# Patient Record
Sex: FEMALE | Race: WHITE | NOT HISPANIC OR LATINO | Employment: FULL TIME | URBAN - METROPOLITAN AREA
[De-identification: names, ages, dates, MRNs, and addresses within clinical notes are randomized per-mention and may not be internally consistent; named-entity substitution may affect disease eponyms.]

---

## 2021-06-30 ENCOUNTER — TRANSFERRED RECORDS (OUTPATIENT)
Dept: HEALTH INFORMATION MANAGEMENT | Facility: CLINIC | Age: 61
End: 2021-06-30

## 2021-07-09 ENCOUNTER — TRANSFERRED RECORDS (OUTPATIENT)
Dept: HEALTH INFORMATION MANAGEMENT | Facility: CLINIC | Age: 61
End: 2021-07-09

## 2021-07-27 ENCOUNTER — TRANSFERRED RECORDS (OUTPATIENT)
Dept: HEALTH INFORMATION MANAGEMENT | Facility: CLINIC | Age: 61
End: 2021-07-27

## 2021-09-03 ENCOUNTER — TRANSFERRED RECORDS (OUTPATIENT)
Dept: HEALTH INFORMATION MANAGEMENT | Facility: CLINIC | Age: 61
End: 2021-09-03

## 2021-09-15 ENCOUNTER — TRANSFERRED RECORDS (OUTPATIENT)
Dept: HEALTH INFORMATION MANAGEMENT | Facility: CLINIC | Age: 61
End: 2021-09-15

## 2021-10-14 ENCOUNTER — TRANSFERRED RECORDS (OUTPATIENT)
Dept: HEALTH INFORMATION MANAGEMENT | Facility: CLINIC | Age: 61
End: 2021-10-14

## 2022-06-01 ENCOUNTER — TRANSFERRED RECORDS (OUTPATIENT)
Dept: HEALTH INFORMATION MANAGEMENT | Facility: CLINIC | Age: 62
End: 2022-06-01

## 2022-06-30 ENCOUNTER — TRANSFERRED RECORDS (OUTPATIENT)
Dept: HEALTH INFORMATION MANAGEMENT | Facility: CLINIC | Age: 62
End: 2022-06-30

## 2022-07-09 ENCOUNTER — TRANSFERRED RECORDS (OUTPATIENT)
Dept: HEALTH INFORMATION MANAGEMENT | Facility: CLINIC | Age: 62
End: 2022-07-09

## 2022-07-14 ENCOUNTER — TRANSFERRED RECORDS (OUTPATIENT)
Dept: HEALTH INFORMATION MANAGEMENT | Facility: CLINIC | Age: 62
End: 2022-07-14

## 2022-07-19 ENCOUNTER — TRANSCRIBE ORDERS (OUTPATIENT)
Dept: OTHER | Age: 62
End: 2022-07-19

## 2022-07-19 DIAGNOSIS — M53.3 SACROILIAC JOINT DYSFUNCTION: Primary | ICD-10-CM

## 2022-08-11 ENCOUNTER — TELEPHONE (OUTPATIENT)
Dept: ORTHOPEDICS | Facility: CLINIC | Age: 62
End: 2022-08-11

## 2022-08-11 NOTE — TELEPHONE ENCOUNTER
Health Call Center    Phone Message    May a detailed message be left on voicemail: yes     Reason for Call: Other: Pt asking for call back from Lily - Dr. Johnson's nurse re: seeing Dr. Johnson sooner than 10/26.  Pt was originally scheduled 10/06.  and lily left ms for pt that she would be able to be seen afternoon of 09/29.   No openings to offer pt.  Pt would like call back at [phone]     Action Taken: Message routed to:  [facility] sally;  Dr. Kenneth Johnson    Travel Screening: Not Applicable

## 2022-08-17 ENCOUNTER — DOCUMENTATION ONLY (OUTPATIENT)
Dept: ORTHOPEDICS | Facility: CLINIC | Age: 62
End: 2022-08-17

## 2022-08-17 NOTE — PROGRESS NOTES
Writer was able to move pt's appointment up to 8/24/22 at 3 pm. Pt will work with referring provider to have records and imaging sent to the clinic here and or bring with to appointment.     Susihla Hernández LPN

## 2022-08-18 ASSESSMENT — ENCOUNTER SYMPTOMS
JOINT SWELLING: 1
NECK PAIN: 0
MYALGIAS: 0
ARTHRALGIAS: 1
STIFFNESS: 1
MUSCLE CRAMPS: 0
BACK PAIN: 1

## 2022-08-24 ENCOUNTER — ANCILLARY PROCEDURE (OUTPATIENT)
Dept: GENERAL RADIOLOGY | Facility: CLINIC | Age: 62
End: 2022-08-24
Attending: ORTHOPAEDIC SURGERY
Payer: COMMERCIAL

## 2022-08-24 ENCOUNTER — PRE VISIT (OUTPATIENT)
Dept: ORTHOPEDICS | Facility: CLINIC | Age: 62
End: 2022-08-24

## 2022-08-24 ENCOUNTER — OFFICE VISIT (OUTPATIENT)
Dept: ORTHOPEDICS | Facility: CLINIC | Age: 62
End: 2022-08-24
Payer: COMMERCIAL

## 2022-08-24 ENCOUNTER — TRANSFERRED RECORDS (OUTPATIENT)
Dept: HEALTH INFORMATION MANAGEMENT | Facility: CLINIC | Age: 62
End: 2022-08-24

## 2022-08-24 VITALS — HEIGHT: 62 IN | BODY MASS INDEX: 36.8 KG/M2 | WEIGHT: 200 LBS

## 2022-08-24 DIAGNOSIS — M53.3 SI (SACROILIAC) JOINT DYSFUNCTION: Primary | ICD-10-CM

## 2022-08-24 DIAGNOSIS — M53.3 SI (SACROILIAC) JOINT DYSFUNCTION: ICD-10-CM

## 2022-08-24 DIAGNOSIS — M53.3 SACROILIAC JOINT DYSFUNCTION: ICD-10-CM

## 2022-08-24 LAB — RADIOLOGIST FLAGS: NORMAL

## 2022-08-24 PROCEDURE — 77073 BONE LENGTH STUDIES: CPT | Mod: GC | Performed by: STUDENT IN AN ORGANIZED HEALTH CARE EDUCATION/TRAINING PROGRAM

## 2022-08-24 PROCEDURE — 99204 OFFICE O/P NEW MOD 45 MIN: CPT | Mod: GC | Performed by: ORTHOPAEDIC SURGERY

## 2022-08-24 PROCEDURE — 72082 X-RAY EXAM ENTIRE SPI 2/3 VW: CPT | Mod: GC | Performed by: STUDENT IN AN ORGANIZED HEALTH CARE EDUCATION/TRAINING PROGRAM

## 2022-08-24 RX ORDER — ACETAMINOPHEN AND CODEINE PHOSPHATE 300; 30 MG/1; MG/1
TABLET ORAL
COMMUNITY

## 2022-08-24 RX ORDER — ALBUTEROL SULFATE 90 UG/1
AEROSOL, METERED RESPIRATORY (INHALATION)
COMMUNITY
Start: 2022-07-26

## 2022-08-24 RX ORDER — CELECOXIB 200 MG/1
CAPSULE ORAL
COMMUNITY
Start: 2022-06-02

## 2022-08-24 RX ORDER — ALPRAZOLAM 0.5 MG
TABLET ORAL
COMMUNITY
Start: 2022-04-10

## 2022-08-24 RX ORDER — BUDESONIDE AND FORMOTEROL FUMARATE DIHYDRATE 80; 4.5 UG/1; UG/1
AEROSOL RESPIRATORY (INHALATION)
COMMUNITY

## 2022-08-24 RX ORDER — IBUPROFEN 200 MG
200 TABLET ORAL EVERY 4 HOURS PRN
COMMUNITY

## 2022-08-24 NOTE — LETTER
8/24/2022         RE: Angie Reid  [address]  [place] [address]        Dear Colleague,    Thank you for referring your patient, Angie Reid, to the [facility] [place]. Please see a copy of my visit note below.    REASON FOR CONSULTATION: Consult (Sacroiliac joint dysfunction /  Dr. My Jimenez, [facility] )       REFERRING PHYSICIAN: My Jimenez     PRIMARY CARE PHYSICIAN: Naima Escobar    HISTORY OF PRESENT ILLNESS: Angie Reid is a pleasant 61 year old female with no stated past medical history presents to the orthopedic spine clinic for evaluation of bilateral SI pain.  The patient states that she has pain in the gluteal region on both the right and the left, however the right side seems to be worse than the left.  She describes the pain as sharp, as well as achy, and notes that it radiates down the entirety of her right leg.  She notes that the left leg does radiate at times, but the right is certainly worse than the left.  She denies any associated numbness or tingling.  States that she has tried multimodal conservative cares for treatment of her pain including NSAIDs, activity modifications, physical therapy targeted at SI joint pain.  Additionally, she has attempted 2 corticosteroid injections into the SI joint, under fluoroscopic guidance.  This was done on 5/19/2022 and 6/29/2022.  Both provided 100% relief of her symptoms for period of 7 days, however thereafter wore off entirely.  Notes that this has been an ongoing issue for her since 2015, however it worsened in 2020 and is now up to the point where she needs to do something about it.      Oswestry score:   Oswestry (SANDRA) Questionnaire    OSWESTRY DISABILITY INDEX 8/18/2022   Count 10   Sum 14   Oswestry Score (%) 28       Visual Analog Scale (VAS) Questionnaire    VISUAL ANALOG PAIN SCALE 8/24/2022   Back Pain Scale 0-10 2   Right leg pain 1   Left leg pain 0   Neck Pain Scale 0-10  "0   Right arm pain 0   Left arm pain 0            REVIEW OF SYSTEMS:   See HPI for pertinent positives. Otherwise complete ROS negative.     No Known Allergies    No past medical history on file.    No past surgical history on file.    No family history on file.    Social History     Tobacco Use     Smoking status: Not on file     Smokeless tobacco: Not on file   Substance Use Topics     Alcohol use: Not on file       Current Outpatient Medications   Medication     acetaminophen-codeine (TYLENOL W/CODEINE #3) 300-30 MG per tablet     albuterol (PROAIR HFA/PROVENTIL HFA/VENTOLIN HFA) 108 (90 Base) MCG/ACT inhaler     ALPRAZolam (XANAX) 0.5 MG tablet     Bacillus Coagulans-Inulin (PROBIOTIC FORMULA PO)     budesonide-formoterol (SYMBICORT) 80-4.5 MCG/ACT Inhaler     celecoxib (CELEBREX) 200 MG capsule     ibuprofen (ADVIL/MOTRIN) 200 MG tablet     MULTIPLE MINERALS-VITAMINS PO     tiZANidine (ZANAFLEX) 4 MG tablet     No current facility-administered medications for this visit.       PHYSICAL EXAM:    Ht 1.583 m (5' 2.32\")   Wt 90.7 kg (200 lb)   BMI 36.20 kg/m      Constitutional - Patient is healthy, well-nourished and appears stated age    Respiratory - Patient is breathing normally and in no respiratory distress.    Skin - No suspicious rashes or lesions.    Gait- raises from chair without assistance. Non-antalgic gait. Able to tandem gait.     Neurologic -sensation is intact to light touch in the L1-S2 nerve distributions, bilaterally.    Spine:   -   o Thoracic Spine: normal kyphosis  - Palpation - Non-tender to palpation  o Lumbar Spine:  - Appearance - Normal  - Palpation - Non-tender to palpation at the spinous processes, or the facets no tenderness to palpation over the posterior superior iliac spines.  - ROM - Full, no pain with flexion, extension, rotation  - Facets non-tender to palpation, facet loading negative  - Straight leg raise negative    Musculoskeletal:  o Motor -gross motor function is intact " and 5 out of 5 in strength in the bilateral lower extremities, all major muscle groups  o Hips: bilateral hips nontender to palpation, ALLYN -, no pain with ROM  o Pirifomis stretch test POSITIVE.     SI joint exam:       Right   Left     Star Finger Test    -   -     ALLYN    -   -     Thigh Thrust:   -   -     Pelvic Compression Test    -   -     Palpation    -   -     Pelvic Gapping    -   -     Gaenslen s Test    -   -     Sacral Thrust (SI)   +   +     IMAGING: all imaging is personally reviewed and interpreted during the visit.   Scoliosis EOS Spine XR, dated 8/24/2022.  Independently reviewed by myself, demonstrating normal coronal and sagittal alignment of the spine.  The hip joints are concentric, without evidence of osteoarthritis.  No acute bony abnormalities.      CLINICAL ASSESSMENT:   61 year old female with a 7-year history of bilateral gluteal pain radiating down the lateral aspect of the legs.  The right side is worse than the left.  On exam today, the patient does have reproducible buttock pain with provocative SI joint testing maneuvers, however the SI joint itself is not tender or symptomatic with these maneuvers.  She does have fairly significant tenderness with palpation over the piriformis muscle belly, bilaterally.  The right does appear to be worse than the left.  Her pain is associated with radiation down the bilateral legs, again right worse than left.  Her clinical exam and history are most consistent with piriformis syndrome.  Her exam and history are compounded, however, by the fact that she did have excellent symptomatic relief from her fluoroscopically guided SI joint injections.  She states that she had 100% relief of her symptoms for period of 1 week following her injections.  This certainly does lead to clinical suspicion that the SI joints are involved to some degree, however there does appear to be overlap between clinical piriformis syndrome and SI joint pain for Angeline  At  this point in time, we will continue forward with planned treatment for piriformis syndrome.  This will include targeted physical therapy, stretching exercises.  We will have her obtain a imaging guided corticosteroid injection at the piriformis, to evaluate her symptom progression.  If minimal or unsatisfactory symptom relief is obtained, can consider a dorsal rami block on the right side.  The patient was reassured by all this, as she wishes to avoid surgery.  We will have her follow-up with her primary care provider for continuation of her care plan.  This plan was formulated using shared decision-making process, all questions and concerns were addressed.      All questions and concerns were answered to the patient's apparent satisfaction before leaving the clinic. We used the patient's imaging, diagrams, models to explain the pathophysiology of their disease as well as surgical and non-surgical treatment options.     Thank you for allowing us to be a part of this patient's care.   The above plan was formulated by Dr. Johnson who also saw and examined the patient.     Respectfully,  Nomi Ward MD     I saw and evaluated the patient and developed the plan. I called and spoke to Dr. My Jimenez and explained my findings and thoughts. Dr. Jimenez can certainly do a piriformis block on the patient and supervise directed PT to try to improve the symptoms.    Kenneth Johnson MD    Addendum: After the visit an incidental finding was noted by radiology of a 5 mm lung nodule right lung base. My nurse, Trista Pabon RN, communicated this to the primary care physician for follow up.  Kenneth Johnson MD      Answers for HPI/ROS submitted by the patient on 8/18/2022  General Symptoms: No  Skin Symptoms: No  HENT Symptoms: No  EYE SYMPTOMS: No  HEART SYMPTOMS: No  LUNG SYMPTOMS: No  INTESTINAL SYMPTOMS: No  URINARY SYMPTOMS: No  GYNECOLOGIC SYMPTOMS: No  BREAST SYMPTOMS: No  SKELETAL SYMPTOMS:  Yes  BLOOD SYMPTOMS: No  NERVOUS SYSTEM SYMPTOMS: No  MENTAL HEALTH SYMPTOMS: No  Back pain: Yes  Muscle aches: No  Neck pain: No  Swollen joints: Yes  Joint pain: Yes  Bone pain: No  Muscle cramps: No  Joint stiffness: Yes  Bone fracture: No

## 2022-08-24 NOTE — PROGRESS NOTES
REASON FOR CONSULTATION: Consult (Sacroiliac joint dysfunction /  Dr. My Jimenez, [facility] )       REFERRING PHYSICIAN: My Jimenez     PRIMARY CARE PHYSICIAN: Naima Escobar    HISTORY OF PRESENT ILLNESS: Angie Reid is a pleasant 61 year old female with no stated past medical history presents to the orthopedic spine clinic for evaluation of bilateral SI pain.  The patient states that she has pain in the gluteal region on both the right and the left, however the right side seems to be worse than the left.  She describes the pain as sharp, as well as achy, and notes that it radiates down the entirety of her right leg.  She notes that the left leg does radiate at times, but the right is certainly worse than the left.  She denies any associated numbness or tingling.  States that she has tried multimodal conservative cares for treatment of her pain including NSAIDs, activity modifications, physical therapy targeted at SI joint pain.  Additionally, she has attempted 2 corticosteroid injections into the SI joint, under fluoroscopic guidance.  This was done on 5/19/2022 and 6/29/2022.  Both provided 100% relief of her symptoms for period of 7 days, however thereafter wore off entirely.  Notes that this has been an ongoing issue for her since 2015, however it worsened in 2020 and is now up to the point where she needs to do something about it.      Oswestry score:   Oswestry (SANDRA) Questionnaire    OSWESTRY DISABILITY INDEX 8/18/2022   Count 10   Sum 14   Oswestry Score (%) 28       Visual Analog Scale (VAS) Questionnaire    VISUAL ANALOG PAIN SCALE 8/24/2022   Back Pain Scale 0-10 2   Right leg pain 1   Left leg pain 0   Neck Pain Scale 0-10 0   Right arm pain 0   Left arm pain 0            REVIEW OF SYSTEMS:   See HPI for pertinent positives. Otherwise complete ROS negative.     No Known Allergies    No past medical history on file.    No past surgical history on file.    No family  "history on file.    Social History     Tobacco Use     Smoking status: Not on file     Smokeless tobacco: Not on file   Substance Use Topics     Alcohol use: Not on file       Current Outpatient Medications   Medication     acetaminophen-codeine (TYLENOL W/CODEINE #3) 300-30 MG per tablet     albuterol (PROAIR HFA/PROVENTIL HFA/VENTOLIN HFA) 108 (90 Base) MCG/ACT inhaler     ALPRAZolam (XANAX) 0.5 MG tablet     Bacillus Coagulans-Inulin (PROBIOTIC FORMULA PO)     budesonide-formoterol (SYMBICORT) 80-4.5 MCG/ACT Inhaler     celecoxib (CELEBREX) 200 MG capsule     ibuprofen (ADVIL/MOTRIN) 200 MG tablet     MULTIPLE MINERALS-VITAMINS PO     tiZANidine (ZANAFLEX) 4 MG tablet     No current facility-administered medications for this visit.       PHYSICAL EXAM:    Ht 1.583 m (5' 2.32\")   Wt 90.7 kg (200 lb)   BMI 36.20 kg/m      Constitutional - Patient is healthy, well-nourished and appears stated age    Respiratory - Patient is breathing normally and in no respiratory distress.    Skin - No suspicious rashes or lesions.    Gait- raises from chair without assistance. Non-antalgic gait. Able to tandem gait.     Neurologic -sensation is intact to light touch in the L1-S2 nerve distributions, bilaterally.    Spine:   -   o Thoracic Spine: normal kyphosis  - Palpation - Non-tender to palpation  o Lumbar Spine:  - Appearance - Normal  - Palpation - Non-tender to palpation at the spinous processes, or the facets no tenderness to palpation over the posterior superior iliac spines.  - ROM - Full, no pain with flexion, extension, rotation  - Facets non-tender to palpation, facet loading negative  - Straight leg raise negative    Musculoskeletal:  o Motor -gross motor function is intact and 5 out of 5 in strength in the bilateral lower extremities, all major muscle groups  o Hips: bilateral hips nontender to palpation, ALLYN -, no pain with ROM  o Pirifomis stretch test POSITIVE.     SI joint exam:       Right   Left     Star " Finger Test    -   -     ALLYN    -   -     Thigh Thrust:   -   -     Pelvic Compression Test    -   -     Palpation    -   -     Pelvic Gapping    -   -     Gaenslen s Test    -   -     Sacral Thrust (SI)   +   +     IMAGING: all imaging is personally reviewed and interpreted during the visit.   Scoliosis EOS Spine XR, dated 8/24/2022.  Independently reviewed by myself, demonstrating normal coronal and sagittal alignment of the spine.  The hip joints are concentric, without evidence of osteoarthritis.  No acute bony abnormalities.      CLINICAL ASSESSMENT:   61 year old female with a 7-year history of bilateral gluteal pain radiating down the lateral aspect of the legs.  The right side is worse than the left.  On exam today, the patient does have reproducible buttock pain with provocative SI joint testing maneuvers, however the SI joint itself is not tender or symptomatic with these maneuvers.  She does have fairly significant tenderness with palpation over the piriformis muscle belly, bilaterally.  The right does appear to be worse than the left.  Her pain is associated with radiation down the bilateral legs, again right worse than left.  Her clinical exam and history are most consistent with piriformis syndrome.  Her exam and history are compounded, however, by the fact that she did have excellent symptomatic relief from her fluoroscopically guided SI joint injections.  She states that she had 100% relief of her symptoms for period of 1 week following her injections.  This certainly does lead to clinical suspicion that the SI joints are involved to some degree, however there does appear to be overlap between clinical piriformis syndrome and SI joint pain for Angie.  At this point in time, we will continue forward with planned treatment for piriformis syndrome.  This will include targeted physical therapy, stretching exercises.  We will have her obtain a imaging guided corticosteroid injection at the piriformis,  to evaluate her symptom progression.  If minimal or unsatisfactory symptom relief is obtained, can consider a dorsal rami block on the right side.  The patient was reassured by all this, as she wishes to avoid surgery.  We will have her follow-up with her primary care provider for continuation of her care plan.  This plan was formulated using shared decision-making process, all questions and concerns were addressed.      All questions and concerns were answered to the patient's apparent satisfaction before leaving the clinic. We used the patient's imaging, diagrams, models to explain the pathophysiology of their disease as well as surgical and non-surgical treatment options.     Thank you for allowing us to be a part of this patient's care.   The above plan was formulated by Dr. Johnson who also saw and examined the patient.     Respectfully,  Nomi Ward MD     I saw and evaluated the patient and developed the plan. I called and spoke to Dr. My Jimenez and explained my findings and thoughts. Dr. Jimenez can certainly do a piriformis block on the patient and supervise directed PT to try to improve the symptoms.    Kenneth Johnson MD    Addendum: After the visit an incidental finding was noted by radiology of a 5 mm lung nodule right lung base. My nurse, Trista Pabon RN, communicated this to the primary care physician for follow up.  Kenneth Johnson MD      Answers for HPI/ROS submitted by the patient on 8/18/2022  General Symptoms: No  Skin Symptoms: No  HENT Symptoms: No  EYE SYMPTOMS: No  HEART SYMPTOMS: No  LUNG SYMPTOMS: No  INTESTINAL SYMPTOMS: No  URINARY SYMPTOMS: No  GYNECOLOGIC SYMPTOMS: No  BREAST SYMPTOMS: No  SKELETAL SYMPTOMS: Yes  BLOOD SYMPTOMS: No  NERVOUS SYSTEM SYMPTOMS: No  MENTAL HEALTH SYMPTOMS: No  Back pain: Yes  Muscle aches: No  Neck pain: No  Swollen joints: Yes  Joint pain: Yes  Bone pain: No  Muscle cramps: No  Joint stiffness: Yes  Bone fracture: No

## 2022-08-24 NOTE — NURSING NOTE
"Reason For Visit:   Chief Complaint   Patient presents with     Consult     Sacroiliac joint dysfunction /  Dr. My Jimenez, [facility]        Primary MD: Naima Escobar  Ref. MD: Dr. Jimenez    ?  No  Occupation Business data analysis.  Currently working? Yes.  Work status?  Full time.    Date of injury: No  Type of injury: No.    Date of surgery: No  Type of surgery: No.    Smoker: No  Request smoking cessation information: No    Ht 1.583 m (5' 2.32\")   Wt 90.7 kg (200 lb)   BMI 36.20 kg/m      Pain Assessment  Patient Currently in Pain: Yes  0-10 Pain Scale: 2  Primary Pain Location: Back    Oswestry (SANDRA) Questionnaire    OSWESTRY DISABILITY INDEX 8/18/2022   Count 10   Sum 14   Oswestry Score (%) 28        Visual Analog Pain Scale  Back Pain Scale 0-10: 2  Right leg pain: 1  Left leg pain: 0  Neck Pain Scale 0-10: 0  Right arm pain: 0  Left arm pain: 0    Promis 10 Assessment    PROMIS 10 8/18/2022   In general, would you say your health is: Good   In general, would you say your quality of life is: Fair   In general, how would you rate your physical health? Good   In general, how would you rate your mental health, including your mood and your ability to think? Good   In general, how would you rate your satisfaction with your social activities and relationships? Good   In general, please rate how well you carry out your usual social activities and roles Fair   To what extent are you able to carry out your everyday physical activities such as walking, climbing stairs, carrying groceries, or moving a chair? Moderately   How often have you been bothered by emotional problems such as feeling anxious, depressed or irritable? Sometimes   How would you rate your fatigue on average? Moderate   How would you rate your pain on average?   0 = No Pain  to  10 = Worst Imaginable Pain 4   In general, would you say your health is: 3   In general, would you say your quality of life is: 2   In " general, how would you rate your physical health? 3   In general, how would you rate your mental health, including your mood and your ability to think? 3   In general, how would you rate your satisfaction with your social activities and relationships? 3   In general, please rate how well you carry out your usual social activities and roles. (This includes activities at home, at work and in your community, and responsibilities as a parent, child, spouse, employee, friend, etc.) 2   To what extent are you able to carry out your everyday physical activities such as walking, climbing stairs, carrying groceries, or moving a chair? 3   In the past 7 days, how often have you been bothered by emotional problems such as feeling anxious, depressed, or irritable? 3   In the past 7 days, how would you rate your fatigue on average? 3   In the past 7 days, how would you rate your pain on average, where 0 means no pain, and 10 means worst imaginable pain? 4   Global Mental Health Score 11   Global Physical Health Score 12   PROMIS TOTAL - SUBSCORES 23   Some recent data might be hidden                Sushila Hernández LPN

## 2022-08-25 ENCOUNTER — DOCUMENTATION ONLY (OUTPATIENT)
Dept: ORTHOPEDICS | Facility: CLINIC | Age: 62
End: 2022-08-25

## 2022-08-25 NOTE — PROGRESS NOTES
Received notice from Rad Incidental finding on EOS XR  appt yesterday Right lung nodule.    I reviewed with  who ordered F/U with Primary provider.   I called pt who stated her Primary has been following the lung nodule for years & she had PET scan & continues F/U with Primary.  No further needs.  Call back prn.  Pt agreed.    KARINA./Trista Pabon RN.

## 2022-08-25 NOTE — PROGRESS NOTES
Radiologist flag.    XR EOS TOTAL BODY completed on 2022  Incidental findin mm round nodular opacity in the right lung base.    Routed to team.

## 2022-10-03 ENCOUNTER — HEALTH MAINTENANCE LETTER (OUTPATIENT)
Age: 62
End: 2022-10-03

## 2023-05-21 ENCOUNTER — HEALTH MAINTENANCE LETTER (OUTPATIENT)
Age: 63
End: 2023-05-21

## 2024-03-10 ENCOUNTER — HEALTH MAINTENANCE LETTER (OUTPATIENT)
Age: 64
End: 2024-03-10

## 2024-10-06 ENCOUNTER — HEALTH MAINTENANCE LETTER (OUTPATIENT)
Age: 64
End: 2024-10-06

## 2025-03-16 ENCOUNTER — HEALTH MAINTENANCE LETTER (OUTPATIENT)
Age: 65
End: 2025-03-16